# Patient Record
Sex: FEMALE | URBAN - METROPOLITAN AREA
[De-identification: names, ages, dates, MRNs, and addresses within clinical notes are randomized per-mention and may not be internally consistent; named-entity substitution may affect disease eponyms.]

---

## 2020-02-27 ENCOUNTER — APPOINTMENT (RX ONLY)
Dept: URBAN - METROPOLITAN AREA CLINIC 89 | Facility: CLINIC | Age: 20
Setting detail: DERMATOLOGY
End: 2020-02-27

## 2020-02-27 DIAGNOSIS — L30.9 DERMATITIS, UNSPECIFIED: ICD-10-CM

## 2020-02-27 PROCEDURE — ? PRESCRIPTION

## 2020-02-27 PROCEDURE — ? TREATMENT REGIMEN

## 2020-02-27 PROCEDURE — ? RECOMMENDATIONS

## 2020-02-27 PROCEDURE — 99202 OFFICE O/P NEW SF 15 MIN: CPT

## 2020-02-27 RX ORDER — TRIAMCINOLONE ACETONIDE 1 MG/G
CREAM TOPICAL BID
Qty: 1 | Refills: 3 | Status: ERX | COMMUNITY
Start: 2020-02-27

## 2020-02-27 RX ORDER — TRIAMCINOLONE ACETONIDE 1 MG/G
OINTMENT TOPICAL BID
Qty: 1 | Refills: 2 | Status: CANCELLED | COMMUNITY
Start: 2020-02-27

## 2020-02-27 RX ADMIN — TRIAMCINOLONE ACETONIDE: 1 OINTMENT TOPICAL at 00:00

## 2020-02-27 RX ADMIN — TRIAMCINOLONE ACETONIDE: 1 CREAM TOPICAL at 00:00

## 2020-02-27 NOTE — PROCEDURE: TREATMENT REGIMEN
Plan: If not better in 2 weeks, will send in Prednisone
Initiate Treatment: Antihistamines bid-tid\\nTAC 0.1% cream bid x 2-3 weeks
Detail Level: Zone

## 2020-02-27 NOTE — PROCEDURE: RECOMMENDATIONS
Recommendations (Free Text): D/C moisturizers/cleansers with fragrances\\nPt. denies new medications or changes in products except a roula scented cleanser.\\nHad been gardening when rash occurred, she was concerned about Poison Ivy, but rash not c/w poison ivy
Detail Level: Zone